# Patient Record
(demographics unavailable — no encounter records)

---

## 2024-11-25 NOTE — DATA REVIEWED
[FreeTextEntry1] : Discussed with mother via telephone on 2/23/2024. Blood work is significant for an elevated insulin level to 24.5 uU/mL. I again discussed dietary modifications, and Mayra has an appointment scheduled with nutrition already. Mayra's HDL cholesterol is mildly decreased. She also has vitamin D deficiency, and I recommended starting vitamin D 4000 IU daily for now, and we will decrease to 2000 IU daily in 8 weeks. Mayra's cortisol is on the lower end of the normal range, however given the time of day, this could be a normal value, as cortisol peaks at 8/830 am. The remainder of the blood work was either normal or unremarkable.

## 2024-11-25 NOTE — HISTORY OF PRESENT ILLNESS
[FreeTextEntry2] : Mayra is a 12y female here for follow up of obesity, insulin resistance, acanthosis nigricans, and vitamin D deficiency.   Since last visit, Mayra has undergone tonsillectomy and adenoidectomy for her obstructive sleep apnea. She denies polyuria, polydipsia, and nocturia. Since last visit, Mayra has seen nutrition and reports ***.  Nutirtion? Vitamin D?